# Patient Record
Sex: MALE | Race: WHITE | URBAN - METROPOLITAN AREA
[De-identification: names, ages, dates, MRNs, and addresses within clinical notes are randomized per-mention and may not be internally consistent; named-entity substitution may affect disease eponyms.]

---

## 2018-06-08 ENCOUNTER — THERAPY VISIT (OUTPATIENT)
Dept: PHYSICAL THERAPY | Facility: CLINIC | Age: 48
End: 2018-06-08
Payer: COMMERCIAL

## 2018-06-08 DIAGNOSIS — M25.511 ACUTE PAIN OF RIGHT SHOULDER: Primary | ICD-10-CM

## 2018-06-08 PROCEDURE — 97110 THERAPEUTIC EXERCISES: CPT | Mod: GP | Performed by: PHYSICAL THERAPIST

## 2018-06-08 PROCEDURE — 97161 PT EVAL LOW COMPLEX 20 MIN: CPT | Mod: GP | Performed by: PHYSICAL THERAPIST

## 2018-06-08 PROCEDURE — 97112 NEUROMUSCULAR REEDUCATION: CPT | Mod: GP | Performed by: PHYSICAL THERAPIST

## 2018-06-08 NOTE — LETTER
[facility]  [address] [address] [place] [id number]  [phone]    2018    Re: Obey Ulloa   :   1970  MRN:  [id number]   REFERRING PHYSICIAN:   Cristal Sloan    [facility]    Date of Initial Evaluation:  2018  Visits:  Rxs Used: 1  Reason for Referral:  Acute pain of right shoulder    EVALUATION SUMMARY    [facility] Initial Evaluation  Subjective:  Patient is a 48 year old male presenting with rehab right shoulder hpi.   Obey Ulloa is a 48 year old male with a right shoulder condition.  Condition occurred with:  Lifting.  Condition occurred: at home.  This is a new condition  Pt reports R shoulder pain started >2 years ago attempting to move dock. Pt has had on/off pain in shoulder since, particularly if laying on R side or lifting overhead/out to side. Pt had MD appointment on 18 and referred to PT with diagnosis of R shoulder rotator cuff tendonitis. On 18, pt reports feeling pop in anterior shoulder after pulling stake out of ground. Pt noticing more pain in R biceps, with increased bruising and swelling. Pt had MD appointment on 18 and will be following up with orthopedic.   Patient reports pain:  Anterior.  Radiates to:  Upper arm.  Pain is described as aching and sharp and is intermittent and reported as 3/10.  Associated symptoms:  Loss of motion/stiffness, edema, numbness and tingling.   Symptoms are exacerbated by lifting, certain positions, lying on extremity, carrying and using arm at shoulder level and relieved by ice, NSAID's and activity/movement.  Since onset symptoms are gradually worsening.  Special testing: none.  Previous treatment: none.    General health as reported by patient is good.  Pertinent medical history includes:  High blood pressure and depression.  Medical allergies: no.  Other surgeries include:  Orthopedic surgery (spinal fusion).  Current medications:   Cardiac medication, anti-inflammatory, anti-depressants and high blood pressure medication.  Current occupation is .    Barriers include:  None as reported by the patient.  Red flags:  None as reported by the patient.    Objective:  Standing Alignment:    Shoulder/UE:  Rounded shoulders and scapular winging R  Shoulder Evaluation:  ROM:  AROM:    Flexion:  Left:  160    Right:  145 +pain  Abduction:  Left: 160   Right:  145 +painful arc  External Rotation:  Left:  85    Right:  80  Flexion/External Rotation:  Left:  T3    Right:  T3  Extension/Internal Rotation:  Left:  T9    Right:  T11    Strength:    Flexion: Left:5/5   Pain:    Right: 5-/5      Pain:  +  Abduction:  Left: 5/5  Pain:    Right: 4+/5      Pain:+  Internal Rotation:  Left:5/5     Pain:    Right: 5/5     Pain:  External Rotation:   Left:5-/5     Pain:   Right:4/5      Pain:+    Elbow Flexion:  Left:5/5     Pain:    Right:4+/5      Pain:+  Elbow Extension:  Left:5/5     Pain:    Right:5/5     Pain:  Special Tests:    Right shoulder positive for the following special tests:Impingement  Palpation:    Right shoulder tenderness present at: Levator; Upper Trap and Bicipital Groove     Assessment/Plan:    Patient is a 48 year old male with right side shoulder complaints.    Patient has the following significant findings with corresponding treatment plan.                Diagnosis 1:  R shoulder rotator cuff impingement, suspect R proximal biceps tendon rupture  Pain -  hot/cold therapy, manual therapy, self management, education and home program  Decreased ROM/flexibility - manual therapy, therapeutic exercise, therapeutic activity and home program  Decreased strength - therapeutic exercise, therapeutic activities and home program  Impaired muscle performance - neuro re-education and home program  Decreased function - therapeutic activities and home program  Impaired posture - neuro re-education, therapeutic activities and home  program    Therapy Evaluation Codes:   1) History comprised of:   Personal factors that impact the plan of care:      None.    Comorbidity factors that impact the plan of care are:      Asthma, Depression, High blood pressure and Numbness/tingling.     Medications impacting care: Anti-depressant, Anti-inflammatory, Cardiac and High blood pressure.  2) Examination of Body Systems comprised of:   Body structures and functions that impact the plan of care:      Shoulder.   Activity limitations that impact the plan of care are:      Lifting, Sleeping and Laying down.  3) Clinical presentation characteristics are:   Stable/Uncomplicated.  4) Decision-Making    Low complexity using standardized patient assessment instrument and/or measureable assessment of functional outcome.  Cumulative Therapy Evaluation is: Low complexity.    Previous and current functional limitations:  (See Goal Flow Sheet for this information)    Short term and Long term goals: (See Goal Flow Sheet for this information)     Communication ability:  Patient appears to be able to clearly communicate and understand verbal and written communication and follow directions correctly.  Treatment Explanation - The following has been discussed with the patient:   RX ordered/plan of care  Anticipated outcomes  Possible risks and side effects  This patient would benefit from PT intervention to resume normal activities.   Rehab potential is good.    Frequency:  1 X week, once daily  Duration:  for 6 weeks  Discharge Plan:  Achieve all LTG.  Independent in home treatment program.  Return to previous functional level by discharge.  Reach maximal therapeutic benefit.    Thank you for your referral.    INQUIRIES  Therapist: SUSAN Frankel [address] [address]  Phone: [phone]  Fax: [phone]

## 2018-06-08 NOTE — PROGRESS NOTES
[facility] Initial Evaluation  Subjective:  Patient is a 48 year old male presenting with rehab right shoulder hpi.   Obey Ulloa is a 48 year old male with a right shoulder condition.  Condition occurred with:  Lifting.  Condition occurred: at home.  This is a new condition  Pt reports R shoulder pain started >2 years ago attempting to move dock. Pt has had on/off pain in shoulder since, particularly if laying on R side or lifting overhead/out to side. Pt had MD appointment on 5/14/18 and referred to PT with diagnosis of R shoulder rotator cuff tendonitis. On 6/6/18, pt reports feeling pop in anterior shoulder after pulling stake out of ground. Pt noticing more pain in R biceps, with increased bruising and swelling. Pt had MD appointment on 6/8/18 and will be following up with orthopedic. .    Patient reports pain:  Anterior.  Radiates to:  Upper arm.  Pain is described as aching and sharp and is intermittent and reported as 3/10.  Associated symptoms:  Loss of motion/stiffness, edema, numbness and tingling.   Symptoms are exacerbated by lifting, certain positions, lying on extremity, carrying and using arm at shoulder level and relieved by ice, NSAID's and activity/movement.  Since onset symptoms are gradually worsening.  Special testing: none.  Previous treatment: none.    General health as reported by patient is good.                  Barriers include:  None as reported by the patient.    Red flags:  None as reported by the patient.                        Objective:  Standing Alignment:      Shoulder/UE:  Rounded shoulders and scapular winging R                                       Shoulder Evaluation:  ROM:  AROM:    Flexion:  Left:  160    Right:  145 +pain    Abduction:  Left: 160   Right:  145 +painful arc      External Rotation:  Left:  85    Right:  80          Flexion/External Rotation:  Left:  T3    Right:  T3  Extension/Internal Rotation:  Left:  T9    Right:  T11           Strength:    Flexion: Left:5/5   Pain:    Right: 5-/5      Pain:  +    Abduction:  Left: 5/5  Pain:    Right: 4+/5      Pain:+    Internal Rotation:  Left:5/5     Pain:    Right: 5/5     Pain:  External Rotation:   Left:5-/5     Pain:   Right:4/5      Pain:+        Elbow Flexion:  Left:5/5     Pain:    Right:4+/5      Pain:+  Elbow Extension:  Left:5/5     Pain:    Right:5/5     Pain:    Special Tests:      Right shoulder positive for the following special tests:Impingement  Palpation:      Right shoulder tenderness present at: Levator; Upper Trap and Bicipital Groove                                     General     ROS    Assessment/Plan:    Patient is a 48 year old male with right side shoulder complaints.    Patient has the following significant findings with corresponding treatment plan.                Diagnosis 1:  R shoulder rotator cuff impingement, suspect R proximal biceps tendon rupture  Pain -  hot/cold therapy, manual therapy, self management, education and home program  Decreased ROM/flexibility - manual therapy, therapeutic exercise, therapeutic activity and home program  Decreased strength - therapeutic exercise, therapeutic activities and home program  Impaired muscle performance - neuro re-education and home program  Decreased function - therapeutic activities and home program  Impaired posture - neuro re-education, therapeutic activities and home program    Therapy Evaluation Codes:   1) History comprised of:   Personal factors that impact the plan of care:      None.    Comorbidity factors that impact the plan of care are:      Asthma, Depression, High blood pressure and Numbness/tingling.     Medications impacting care: Anti-depressant, Anti-inflammatory, Cardiac and High blood pressure.  2) Examination of Body Systems comprised of:   Body structures and functions that impact the plan of care:      Shoulder.   Activity limitations that impact the plan of care are:      Lifting, Sleeping  and Laying down.  3) Clinical presentation characteristics are:   Stable/Uncomplicated.  4) Decision-Making    Low complexity using standardized patient assessment instrument and/or measureable assessment of functional outcome.  Cumulative Therapy Evaluation is: Low complexity.    Previous and current functional limitations:  (See Goal Flow Sheet for this information)    Short term and Long term goals: (See Goal Flow Sheet for this information)     Communication ability:  Patient appears to be able to clearly communicate and understand verbal and written communication and follow directions correctly.  Treatment Explanation - The following has been discussed with the patient:   RX ordered/plan of care  Anticipated outcomes  Possible risks and side effects  This patient would benefit from PT intervention to resume normal activities.   Rehab potential is good.    Frequency:  1 X week, once daily  Duration:  for 6 weeks  Discharge Plan:  Achieve all LTG.  Independent in home treatment program.  Return to previous functional level by discharge.  Reach maximal therapeutic benefit.    Please refer to the daily flowsheet for treatment today, total treatment time and time spent performing 1:1 timed codes.     Initial evaluation equal to discharge summary.

## 2018-06-11 NOTE — PROGRESS NOTES
[facility] Initial Evaluation  Subjective:  Patient is a 48 year old male presenting with rehab left ankle/foot hpi.                                      Pertinent medical history includes:  High blood pressure and depression.  Medical allergies: no.  Other surgeries include:  Orthopedic surgery (spinal fusion).  Current medications:  Cardiac medication, anti-inflammatory, anti-depressants and high blood pressure medication.  Current occupation is .                                    Objective:  System    Physical Exam    General     ROS    Assessment/Plan:

## 2018-08-27 PROBLEM — M25.511 ACUTE PAIN OF RIGHT SHOULDER: Status: RESOLVED | Noted: 2018-06-08 | Resolved: 2018-08-27

## 2021-05-28 ENCOUNTER — RECORDS - HEALTHEAST (OUTPATIENT)
Dept: ADMINISTRATIVE | Facility: CLINIC | Age: 51
End: 2021-05-28